# Patient Record
Sex: MALE | Race: WHITE | NOT HISPANIC OR LATINO | URBAN - METROPOLITAN AREA
[De-identification: names, ages, dates, MRNs, and addresses within clinical notes are randomized per-mention and may not be internally consistent; named-entity substitution may affect disease eponyms.]

---

## 2022-06-02 ENCOUNTER — OFFICE (OUTPATIENT)
Dept: URBAN - METROPOLITAN AREA CLINIC 11 | Facility: CLINIC | Age: 72
End: 2022-06-02

## 2022-06-02 VITALS
HEIGHT: 74 IN | OXYGEN SATURATION: 88 % | HEART RATE: 83 BPM | DIASTOLIC BLOOD PRESSURE: 85 MMHG | WEIGHT: 195 LBS | SYSTOLIC BLOOD PRESSURE: 132 MMHG

## 2022-06-02 DIAGNOSIS — K40.90 UNILATERAL INGUINAL HERNIA, WITHOUT OBSTRUCTION OR GANGRENE,: ICD-10-CM

## 2022-06-02 DIAGNOSIS — I71.4 ABDOMINAL AORTIC ANEURYSM, WITHOUT RUPTURE: ICD-10-CM

## 2022-06-02 PROCEDURE — 99203 OFFICE O/P NEW LOW 30 MIN: CPT

## 2022-06-02 NOTE — SERVICENOTES
I will call the patient in approximately 3 months to discuss future colonoscopy.  Patient has under approximately 5 x 5 cm abdominal aortic aneurysm that needs to be repaired, patient has appointment with vascular surgeon today.  Patient also has large right inguinal hernia and is going did discuss potential simultaneous repair with vascular surgeon today (Gen surg + vascular during same procedure).  I explained to him that his aortic aneurysm takes priority over our examinations, to which she verbalized understanding.

## 2022-06-02 NOTE — SERVICEHPINOTES
Camron is a very pleasant 71-year-old white male who presents to our clinic to establish GI care after referral from his PCP, Dr. Lukasz Ugalde. He was seen by Dr. Ugalde on May 13, 2022 and complained of right-sided inguinal hernia and a discuss screening colonoscopy, as patient has never had 1. They discovered a pulsatile abdominal mass and performed a CT, which was notable 5 5 x 4 cm infrarenal abdominal aortic aneurysm and bilateral inguinal hernias. The left inguinal hernia contains fat only. The right inguinal hernia contains several loops of non-obstructed distal small bowel and extends approximately 6 cm into the right inguinal canal. He has been referred to vascular surgery reports he has an appointment with them this afternoon.  He tells me that he was told he needs a colonoscopy and inguinal hernia repair, which he thought we would perform.  I informed him that this is typically done by a general surgeon but that I am happy to refer him to 1.  He will discuss this with his vascular surgeon today at their appointment. He denies any pain in his groin, abdomen, chest or neck.  he reports the inguinal hernias only bothersome because of its size. He denies any nausea, vomiting, unintentional weight loss.  He does endorse pyrosis, usually more notable with certain foods such as onions and peppers. He has a bowel movement every morning of normal color and consistency without blood, mucus or rectal pain.  No change in caliber of stool.  No issues with constipation or diarrhea.  He smokes 1 pack per day and has smoked for approximately 45 years.  He endorses occasional alcohol consumption, 2 beers approximately 3 times a week.  No drug or NSAID use.  He denies any family history of stomach or colorectal cancer in first-degree relatives.  He denies any known cardiac, pulmonary, hematologic or neurologic disorders.  He does endorse kidney stones approximately 15 years ago but denies any issues since.